# Patient Record
Sex: FEMALE | Race: WHITE | NOT HISPANIC OR LATINO | URBAN - METROPOLITAN AREA
[De-identification: names, ages, dates, MRNs, and addresses within clinical notes are randomized per-mention and may not be internally consistent; named-entity substitution may affect disease eponyms.]

---

## 2019-12-10 ENCOUNTER — EMERGENCY (EMERGENCY)
Facility: HOSPITAL | Age: 44
LOS: 1 days | Discharge: HOME | End: 2019-12-10
Admitting: EMERGENCY MEDICINE
Payer: OTHER MISCELLANEOUS

## 2019-12-10 VITALS
WEIGHT: 140.21 LBS | RESPIRATION RATE: 16 BRPM | SYSTOLIC BLOOD PRESSURE: 153 MMHG | HEART RATE: 70 BPM | TEMPERATURE: 98 F | DIASTOLIC BLOOD PRESSURE: 84 MMHG | OXYGEN SATURATION: 99 %

## 2019-12-10 PROCEDURE — 70486 CT MAXILLOFACIAL W/O DYE: CPT | Mod: 26

## 2019-12-10 PROCEDURE — 72100 X-RAY EXAM L-S SPINE 2/3 VWS: CPT | Mod: 26

## 2019-12-10 PROCEDURE — 99284 EMERGENCY DEPT VISIT MOD MDM: CPT

## 2019-12-10 PROCEDURE — 72100 X-RAY EXAM L-S SPINE 2/3 VWS: CPT

## 2019-12-10 PROCEDURE — 70450 CT HEAD/BRAIN W/O DYE: CPT | Mod: 26

## 2019-12-10 PROCEDURE — 70450 CT HEAD/BRAIN W/O DYE: CPT

## 2019-12-10 PROCEDURE — 70486 CT MAXILLOFACIAL W/O DYE: CPT

## 2019-12-10 RX ORDER — ACETAMINOPHEN 500 MG
650 TABLET ORAL ONCE
Refills: 0 | Status: COMPLETED | OUTPATIENT
Start: 2019-12-10 | End: 2019-12-10

## 2019-12-10 RX ADMIN — Medication 650 MILLIGRAM(S): at 10:24

## 2019-12-10 NOTE — ED PROVIDER NOTE - MUSCULOSKELETAL, MLM
+ midline tenderness to LS spine on palpation. Full ROM of R hip. no pain to knee or ankle. no tenderness to other extremities.  no rib tenderness no step-offs, deformities, ecchymosis or swelling noted. Spine appears normal, range of motion is not limited, no muscle or joint tenderness

## 2019-12-10 NOTE — ED PROVIDER NOTE - CONSTITUTIONAL, MLM
normal... pt ambulating well. Well appearing, awake, alert, oriented to person, place, time/situation and in no apparent distress.

## 2019-12-10 NOTE — ED ADULT NURSE NOTE - CHPI ED NUR SYMPTOMS NEG
no abrasion/no loss of consciousness/no numbness/no tingling/no vomiting/no weakness/no fever/no bleeding/no deformity/no confusion

## 2019-12-10 NOTE — ED PROVIDER NOTE - CLINICAL SUMMARY MEDICAL DECISION MAKING FREE TEXT BOX
Patient with multiple injuries s/p fall.  Well appearing, NAD and VSS. Ct scan all neg. + MSK pain from fall. Ambulating well. Recommend rest, ice and tylenol/ motrin for pain PRN.

## 2019-12-10 NOTE — ED ADULT TRIAGE NOTE - CHIEF COMPLAINT QUOTE
Patient s/p slipped and fall at work this am , c/o rt hip pain , lower back pain , headache and rt facial pain . Denies any loc . Not on thinner .

## 2019-12-10 NOTE — ED PROVIDER NOTE - ENMT, MLM
Airway patent, no palpable hematoma on R perietalis area of the head. No facial ecchymosis. + tenderness to R zygomatic arch and TMJ. Able to fully open mouth. No raccoon eyes no caceres signs. Full ROM of neck w/o midline tenderness. Airway patent, TMI, no palpable hematoma on R parietal area of the head. No facial ecchymosis. + tenderness to R zygomatic arch and TMJ. Able to fully open mouth. No raccoon eyes no caceres signs. Full ROM of neck w/o midline tenderness.

## 2019-12-10 NOTE — ED ADULT NURSE NOTE - OBJECTIVE STATEMENT
pt arrived sitting in chair, A&O x 3. Patient s/p slipped and fall at work this am , c/o rt hip pain , lower back pain , headache and rt facial pain . Denies any loc . Not on thinner. NAD noted, will continue to monitor.

## 2019-12-10 NOTE — ED PROVIDER NOTE - CARE PLAN
Principal Discharge DX:	Fall  Secondary Diagnosis:	Back pain  Secondary Diagnosis:	Head injury  Secondary Diagnosis:	Muscle strain

## 2019-12-10 NOTE — ED PROVIDER NOTE - NSFOLLOWUPINSTRUCTIONS_ED_ALL_ED_FT
ACUTE LOW BACK PAIN - General Information     Acute Low Back Pain    WHAT YOU NEED TO KNOW:    What is acute low back pain? Acute low back pain is sudden discomfort in your lower back area that lasts for up to 6 weeks. The discomfort makes it difficult to tolerate activity.     What causes or increases my risk for acute low back pain? Conditions that affect the spine, joints, or muscles can cause back pain. These may include arthritis, spinal stenosis (narrowing of the spinal column), muscle tension, or breakdown of the spinal discs. The following increase your risk of back pain:     Repeated bending, lifting, or twisting, or lifting heavy items      Injury from a fall or accident      Lack of regular physical activity       Obesity or pregnancy       Smoking      Aging      Driving, sitting, or standing for long periods      Bad posture while sitting or standing    How is acute low back pain diagnosed? Your healthcare provider will ask about your medical history and examine you. He or she may ask when you last had low back pain and how it started. Show him or her where you feel the pain and what makes it feel better or worse. Tell your provider about the type of pain you have, how bad it is, and how long it lasts. Tell him or her if your pain worsens at night or when you lie down on your back.    How is acute low back pain treated? The goal of treatment is to relieve your pain and help you tolerate activity. Most people with acute low back pain get better within 4 to 6 weeks. You may need any of the following:    NSAIDs help decrease swelling and pain. This medicine is available with or without a doctor's order. NSAIDs can cause stomach bleeding or kidney problems in certain people. If you take blood thinner medicine, always ask your healthcare provider if NSAIDs are safe for you. Always read the medicine label and follow directions.      Acetaminophen decreases pain and fever. It is available without a doctor's order. Ask how much to take and how often to take it. Follow directions. Read the labels of all other medicines you are using to see if they also contain acetaminophen, or ask your doctor or pharmacist. Acetaminophen can cause liver damage if not taken correctly. Do not use more than 4 grams (4,000 milligrams) total of acetaminophen in one day.       Muscle relaxers decrease pain by relaxing the muscles in your lower spine.      Prescription pain medicine may be given. Ask your healthcare provider how to take this medicine safely. Some prescription pain medicines contain acetaminophen. Do not take other medicines that contain acetaminophen without talking to your healthcare provider. Too much acetaminophen may cause liver damage. Prescription pain medicine may cause constipation. Ask your healthcare provider how to prevent or treat constipation.     What can I do to prevent low back pain?     Use proper body mechanics.   Bend at the hips and knees when you  objects. Do not bend from the waist. Use your leg muscles as you lift the load. Do not use your back. Keep the object close to your chest as you lift it. Try not to twist or lift anything above your waist.      Change your position often when you stand for long periods of time. Rest one foot on a small box or footrest, and then switch to the other foot often.      Try not to sit for long periods of time. When you do, sit in a straight-backed chair with your feet flat on the floor. Never reach, pull, or push while you are sitting.      Do exercises that strengthen your back muscles. Warm up before you exercise. Ask your healthcare provider the best exercises for you.      Maintain a healthy weight. Ask your healthcare provider how much you should weigh. Ask him or her to help you create a weight loss plan if you are overweight.    How can I take care of myself if I have acute low back pain?     Stay active as much as you can without causing more pain. Bed rest could make your back pain worse. Start with some light exercises, such as walking. Avoid heavy lifting until your pain is gone. Ask for more information about the activities or exercises that are right for you.       Apply ice on your back for 15 to 20 minutes every hour or as directed. Use an ice pack, or put crushed ice in a plastic bag. Cover it with a towel before you apply it to your skin. Ice helps prevent tissue damage and decreases swelling and pain.       Apply heat on your back for 20 to 30 minutes every 2 hours for as many days as directed. Heat helps decrease pain and muscle spasms. Alternate heat and ice.    When should I seek immediate care?     You have severe pain.      You have sudden stiffness and heaviness down both legs.      You have numbness or weakness in one leg, or pain in both legs.      You have numbness in your genital area or across your lower back.      You cannot control your urine or bowel movements.    When should I contact my healthcare provider?     You have a fever.      You have pain at night or when you rest.      Your pain does not get better with treatment.      You have pain that worsens when you cough or sneeze.      You suddenly feel something pop or snap in your back.      You have questions or concerns about your condition or care.    CARE AGREEMENT:    You have the right to help plan your care. Learn about your health condition and how it may be treated. Discuss treatment options with your healthcare providers to decide what care you want to receive. You always have the right to refuse treatment.        © Copyright [employer] 2019       back to top                      © Copyright [employer] 2019

## 2019-12-10 NOTE — ED ADULT NURSE NOTE - PAIN: PRESENCE, MLM
no nausea/no constipation/no diarrhea/no change in bowel habits/no vomiting complains of pain/discomfort

## 2019-12-10 NOTE — ED PROVIDER NOTE - OBJECTIVE STATEMENT
45 y/o F with no PMHx lower back pain s/p mechanical fall. Pt was at work today, was walking to the cafeteria when the floor was wet, and slipped and fell hitting her R side face and head on the ground at 8:40am. endorses nausea and unsteadiness. denies LOC, dizziness, rib tenderness, abd pain, SOB, and chest pain. 45 y/o F with no PMHx  c/o lower back pain s/p mechanical fall. Pt was at work today, was walking to the cafeteria when the floor was wet, she  slipped and fell hitting her R side face and head on the ground at 8:40am. Endorses nausea and unsteadiness. Denies LOC, dizziness, rib tenderness, abd pain, SOB, and chest pain. No paresthesia, or paresis.

## 2019-12-10 NOTE — ED PROVIDER NOTE - PATIENT PORTAL LINK FT
You can access the FollowMyHealth Patient Portal offered by [facility] by registering at the following website: [email or web address]. By joining [employer]’s FollowMyHealth portal, you will also be able to view your health information using other applications (apps) compatible with our system.

## 2019-12-16 DIAGNOSIS — S39.012A STRAIN OF MUSCLE, FASCIA AND TENDON OF LOWER BACK, INITIAL ENCOUNTER: ICD-10-CM

## 2019-12-16 DIAGNOSIS — M54.5 LOW BACK PAIN: ICD-10-CM
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
## 2019-12-16 DIAGNOSIS — S09.90XA UNSPECIFIED INJURY OF HEAD, INITIAL ENCOUNTER: ICD-10-CM
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
## 2023-05-05 ENCOUNTER — EMERGENCY (EMERGENCY)
Facility: HOSPITAL | Age: 48
LOS: 1 days | Discharge: HOME | End: 2023-05-05
Attending: EMERGENCY MEDICINE | Admitting: EMERGENCY MEDICINE
Payer: COMMERCIAL

## 2023-05-05 VITALS
RESPIRATION RATE: 18 BRPM | HEART RATE: 98 BPM | WEIGHT: 149.03 LBS | TEMPERATURE: 97 F | OXYGEN SATURATION: 100 % | DIASTOLIC BLOOD PRESSURE: 82 MMHG | SYSTOLIC BLOOD PRESSURE: 132 MMHG

## 2023-05-05 VITALS
OXYGEN SATURATION: 99 % | DIASTOLIC BLOOD PRESSURE: 77 MMHG | SYSTOLIC BLOOD PRESSURE: 119 MMHG | HEART RATE: 77 BPM | RESPIRATION RATE: 16 BRPM

## 2023-05-05 DIAGNOSIS — R11.2 NAUSEA WITH VOMITING, UNSPECIFIED: ICD-10-CM

## 2023-05-05 DIAGNOSIS — R55 SYNCOPE AND COLLAPSE: ICD-10-CM

## 2023-05-05 DIAGNOSIS — K29.70 GASTRITIS, UNSPECIFIED, WITHOUT BLEEDING: ICD-10-CM

## 2023-05-05 DIAGNOSIS — R10.13 EPIGASTRIC PAIN: ICD-10-CM

## 2023-05-05 DIAGNOSIS — R68.83 CHILLS (WITHOUT FEVER): ICD-10-CM

## 2023-05-05 PROBLEM — Z78.9 OTHER SPECIFIED HEALTH STATUS: Chronic | Status: ACTIVE | Noted: 2019-12-11

## 2023-05-05 LAB
ALBUMIN SERPL ELPH-MCNC: 4.5 G/DL — SIGNIFICANT CHANGE UP (ref 3.3–5)
ALP SERPL-CCNC: 85 U/L — SIGNIFICANT CHANGE UP (ref 40–120)
ALT FLD-CCNC: 45 U/L — SIGNIFICANT CHANGE UP (ref 10–45)
ANION GAP SERPL CALC-SCNC: 12 MMOL/L — SIGNIFICANT CHANGE UP (ref 5–17)
APPEARANCE UR: CLEAR — SIGNIFICANT CHANGE UP
APTT BLD: 28.7 SEC — SIGNIFICANT CHANGE UP (ref 27.5–35.5)
AST SERPL-CCNC: 33 U/L — SIGNIFICANT CHANGE UP (ref 10–40)
BASOPHILS # BLD AUTO: 0.04 K/UL — SIGNIFICANT CHANGE UP (ref 0–0.2)
BASOPHILS NFR BLD AUTO: 0.5 % — SIGNIFICANT CHANGE UP (ref 0–2)
BILIRUB SERPL-MCNC: 0.4 MG/DL — SIGNIFICANT CHANGE UP (ref 0.2–1.2)
BILIRUB UR-MCNC: NEGATIVE — SIGNIFICANT CHANGE UP
BUN SERPL-MCNC: 13 MG/DL — SIGNIFICANT CHANGE UP (ref 7–23)
CALCIUM SERPL-MCNC: 10 MG/DL — SIGNIFICANT CHANGE UP (ref 8.4–10.5)
CHLORIDE SERPL-SCNC: 100 MMOL/L — SIGNIFICANT CHANGE UP (ref 96–108)
CHOLEST SERPL-MCNC: 243 MG/DL — HIGH
CO2 SERPL-SCNC: 23 MMOL/L — SIGNIFICANT CHANGE UP (ref 22–31)
COLOR SPEC: YELLOW — SIGNIFICANT CHANGE UP
CREAT SERPL-MCNC: 0.67 MG/DL — SIGNIFICANT CHANGE UP (ref 0.5–1.3)
DIFF PNL FLD: NEGATIVE — SIGNIFICANT CHANGE UP
EGFR: 109 ML/MIN/1.73M2 — SIGNIFICANT CHANGE UP
EOSINOPHIL # BLD AUTO: 0.04 K/UL — SIGNIFICANT CHANGE UP (ref 0–0.5)
EOSINOPHIL NFR BLD AUTO: 0.5 % — SIGNIFICANT CHANGE UP (ref 0–6)
GLUCOSE SERPL-MCNC: 191 MG/DL — HIGH (ref 70–99)
GLUCOSE UR QL: NEGATIVE — SIGNIFICANT CHANGE UP
HCT VFR BLD CALC: 40 % — SIGNIFICANT CHANGE UP (ref 34.5–45)
HDLC SERPL-MCNC: 77 MG/DL — SIGNIFICANT CHANGE UP
HGB BLD-MCNC: 13.5 G/DL — SIGNIFICANT CHANGE UP (ref 11.5–15.5)
IMM GRANULOCYTES NFR BLD AUTO: 0.1 % — SIGNIFICANT CHANGE UP (ref 0–0.9)
INR BLD: 0.98 — SIGNIFICANT CHANGE UP (ref 0.88–1.16)
KETONES UR-MCNC: NEGATIVE — SIGNIFICANT CHANGE UP
LEUKOCYTE ESTERASE UR-ACNC: NEGATIVE — SIGNIFICANT CHANGE UP
LIPID PNL WITH DIRECT LDL SERPL: 154 MG/DL — HIGH
LYMPHOCYTES # BLD AUTO: 2.51 K/UL — SIGNIFICANT CHANGE UP (ref 1–3.3)
LYMPHOCYTES # BLD AUTO: 30.4 % — SIGNIFICANT CHANGE UP (ref 13–44)
MCHC RBC-ENTMCNC: 30.2 PG — SIGNIFICANT CHANGE UP (ref 27–34)
MCHC RBC-ENTMCNC: 33.8 GM/DL — SIGNIFICANT CHANGE UP (ref 32–36)
MCV RBC AUTO: 89.5 FL — SIGNIFICANT CHANGE UP (ref 80–100)
MONOCYTES # BLD AUTO: 0.49 K/UL — SIGNIFICANT CHANGE UP (ref 0–0.9)
MONOCYTES NFR BLD AUTO: 5.9 % — SIGNIFICANT CHANGE UP (ref 2–14)
NEUTROPHILS # BLD AUTO: 5.17 K/UL — SIGNIFICANT CHANGE UP (ref 1.8–7.4)
NEUTROPHILS NFR BLD AUTO: 62.6 % — SIGNIFICANT CHANGE UP (ref 43–77)
NITRITE UR-MCNC: NEGATIVE — SIGNIFICANT CHANGE UP
NON HDL CHOLESTEROL: 166 MG/DL — HIGH
NRBC # BLD: 0 /100 WBCS — SIGNIFICANT CHANGE UP (ref 0–0)
PH UR: 7.5 — SIGNIFICANT CHANGE UP (ref 5–8)
PLATELET # BLD AUTO: 293 K/UL — SIGNIFICANT CHANGE UP (ref 150–400)
POTASSIUM SERPL-MCNC: 3.6 MMOL/L — SIGNIFICANT CHANGE UP (ref 3.5–5.3)
POTASSIUM SERPL-SCNC: 3.6 MMOL/L — SIGNIFICANT CHANGE UP (ref 3.5–5.3)
PROT SERPL-MCNC: 7.9 G/DL — SIGNIFICANT CHANGE UP (ref 6–8.3)
PROT UR-MCNC: NEGATIVE MG/DL — SIGNIFICANT CHANGE UP
PROTHROM AB SERPL-ACNC: 11.7 SEC — SIGNIFICANT CHANGE UP (ref 10.5–13.4)
RBC # BLD: 4.47 M/UL — SIGNIFICANT CHANGE UP (ref 3.8–5.2)
RBC # FLD: 13.1 % — SIGNIFICANT CHANGE UP (ref 10.3–14.5)
SODIUM SERPL-SCNC: 135 MMOL/L — SIGNIFICANT CHANGE UP (ref 135–145)
SP GR SPEC: 1.01 — SIGNIFICANT CHANGE UP (ref 1–1.03)
TRIGL SERPL-MCNC: 61 MG/DL — SIGNIFICANT CHANGE UP
TROPONIN T SERPL-MCNC: 0.01 NG/ML — SIGNIFICANT CHANGE UP (ref 0–0.01)
UROBILINOGEN FLD QL: 0.2 E.U./DL — SIGNIFICANT CHANGE UP
WBC # BLD: 8.26 K/UL — SIGNIFICANT CHANGE UP (ref 3.8–10.5)
WBC # FLD AUTO: 8.26 K/UL — SIGNIFICANT CHANGE UP (ref 3.8–10.5)

## 2023-05-05 PROCEDURE — 70498 CT ANGIOGRAPHY NECK: CPT | Mod: MA

## 2023-05-05 PROCEDURE — 99283 EMERGENCY DEPT VISIT LOW MDM: CPT | Mod: 25

## 2023-05-05 PROCEDURE — 76705 ECHO EXAM OF ABDOMEN: CPT | Mod: 26

## 2023-05-05 PROCEDURE — 70450 CT HEAD/BRAIN W/O DYE: CPT | Mod: MA

## 2023-05-05 PROCEDURE — 0042T: CPT | Mod: MA

## 2023-05-05 PROCEDURE — 76705 ECHO EXAM OF ABDOMEN: CPT

## 2023-05-05 PROCEDURE — 82962 GLUCOSE BLOOD TEST: CPT

## 2023-05-05 PROCEDURE — 80053 COMPREHEN METABOLIC PANEL: CPT

## 2023-05-05 PROCEDURE — 85610 PROTHROMBIN TIME: CPT

## 2023-05-05 PROCEDURE — 70496 CT ANGIOGRAPHY HEAD: CPT | Mod: MA

## 2023-05-05 PROCEDURE — 71045 X-RAY EXAM CHEST 1 VIEW: CPT

## 2023-05-05 PROCEDURE — 99285 EMERGENCY DEPT VISIT HI MDM: CPT

## 2023-05-05 PROCEDURE — 71045 X-RAY EXAM CHEST 1 VIEW: CPT | Mod: 26

## 2023-05-05 PROCEDURE — 81003 URINALYSIS AUTO W/O SCOPE: CPT

## 2023-05-05 PROCEDURE — 36415 COLL VENOUS BLD VENIPUNCTURE: CPT

## 2023-05-05 PROCEDURE — 74177 CT ABD & PELVIS W/CONTRAST: CPT | Mod: 26,MA

## 2023-05-05 PROCEDURE — 80061 LIPID PANEL: CPT

## 2023-05-05 PROCEDURE — 85730 THROMBOPLASTIN TIME PARTIAL: CPT

## 2023-05-05 PROCEDURE — 93005 ELECTROCARDIOGRAM TRACING: CPT

## 2023-05-05 PROCEDURE — 74177 CT ABD & PELVIS W/CONTRAST: CPT | Mod: MA

## 2023-05-05 PROCEDURE — 70498 CT ANGIOGRAPHY NECK: CPT | Mod: 26,MA

## 2023-05-05 PROCEDURE — 84484 ASSAY OF TROPONIN QUANT: CPT

## 2023-05-05 PROCEDURE — 99285 EMERGENCY DEPT VISIT HI MDM: CPT | Mod: 25

## 2023-05-05 PROCEDURE — 85025 COMPLETE CBC W/AUTO DIFF WBC: CPT

## 2023-05-05 PROCEDURE — 70496 CT ANGIOGRAPHY HEAD: CPT | Mod: 26,MA

## 2023-05-05 RX ORDER — SODIUM CHLORIDE 9 MG/ML
1000 INJECTION INTRAMUSCULAR; INTRAVENOUS; SUBCUTANEOUS ONCE
Refills: 0 | Status: COMPLETED | OUTPATIENT
Start: 2023-05-05 | End: 2023-05-05

## 2023-05-05 RX ORDER — MECLIZINE HCL 12.5 MG
25 TABLET ORAL ONCE
Refills: 0 | Status: COMPLETED | OUTPATIENT
Start: 2023-05-05 | End: 2023-05-05

## 2023-05-05 RX ORDER — IOHEXOL 300 MG/ML
30 INJECTION, SOLUTION INTRAVENOUS ONCE
Refills: 0 | Status: COMPLETED | OUTPATIENT
Start: 2023-05-05 | End: 2023-05-05

## 2023-05-05 RX ADMIN — Medication 25 MILLIGRAM(S): at 15:49

## 2023-05-05 RX ADMIN — SODIUM CHLORIDE 1000 MILLILITER(S): 9 INJECTION INTRAMUSCULAR; INTRAVENOUS; SUBCUTANEOUS at 20:17

## 2023-05-05 RX ADMIN — SODIUM CHLORIDE 1000 MILLILITER(S): 9 INJECTION INTRAMUSCULAR; INTRAVENOUS; SUBCUTANEOUS at 15:49

## 2023-05-05 RX ADMIN — IOHEXOL 30 MILLILITER(S): 300 INJECTION, SOLUTION INTRAVENOUS at 16:18

## 2023-05-05 NOTE — ED PROVIDER NOTE - PHYSICAL EXAMINATION
VITAL SIGNS: I have reviewed nursing notes and confirm.  CONSTITUTIONAL: Well-developed; well-nourished; in no acute distress.  SKIN: Agree with RN documentation regarding decubitus evaluation. Remainder of skin exam is warm and dry, no acute rash.  HEAD: Normocephalic; atraumatic.  EYES: PERRL, EOM intact; conjunctiva and sclera clear.  ENT: No nasal discharge; airway clear.  NECK: Supple; non tender.  CARD: S1, S2 normal; no murmurs, gallops, or rubs. Regular rate and rhythm.  RESP: No wheezes, rales or rhonchi.  ABD: Normal bowel sounds; soft; non-distended; epigastric tenderness with no guarding; no hepatosplenomegaly.  EXT: Normal ROM. No clubbing, cyanosis or edema.  LYMPH: No acute cervical adenopathy.  NEURO: Alert, oriented. Grossly unremarkable.  PSYCH: Cooperative, appropriate.

## 2023-05-05 NOTE — ED PROVIDER NOTE - PATIENT PORTAL LINK FT
You can access the FollowMyHealth Patient Portal offered by [facility] by registering at the following website: [email or web address]. By joining [email or web address]’s FollowMyHealth portal, you will also be able to view your health information using other applications (apps) compatible with our system.

## 2023-05-05 NOTE — ED ADULT TRIAGE NOTE - CHIEF COMPLAINT QUOTE
BIBEMS co nonradiating epigastric pain x2 hours s/p eating lunch. Denies CP, SOB, headache, dizziness, neck pain, back pain.

## 2023-05-05 NOTE — ED PROVIDER NOTE - NSFOLLOWUPINSTRUCTIONS_ED_ALL_ED_FT
Abdominal Pain    Many things can cause abdominal pain. Many times, abdominal pain is not caused by a disease and will improve without treatment. Your health care provider will do a physical exam to determine if there is a dangerous cause of your pain; blood tests and imaging may help determine the cause of your pain. However, in many cases, no cause may be found and you may need further testing as an outpatient. Monitor your abdominal pain for any changes.     SEEK IMMEDIATE MEDICAL CARE IF YOU HAVE ANY OF THE FOLLOWING SYMPTOMS: worsening abdominal pain, uncontrollable vomiting, profuse diarrhea, inability to have bowel movements or pass gas, black or bloody stools, fever accompanying chest pain or back pain, or fainting. These symptoms may represent a serious problem that is an emergency. Do not wait to see if the symptoms will go away. Get medical help right away. Call 911 and do not drive yourself to the hospital.       SEEK IMMEDIATE MEDICAL CARE IF YOU HAVE ANY OF THE FOLLOWING SYMPTOMS: severe headache, pain in your chest/abdomen/back, bleeding from your mouth or rectum, palpitations, shortness of breath, pain with breathing, seizure, confusion, or trouble walking.

## 2023-05-05 NOTE — ED PROVIDER NOTE - WR INTERPRETATION 1
CXR negative - No tracheal deviation, No pneumothorax, No subdiaphragmaticCXR negative - No CHF, No cardiomegaly, No pleural effusionsCXR negative - No infiltrates, No consolidation, No atelectasis seenCXR negative - No pneumothorax, No opacities, No free

## 2023-05-05 NOTE — ED ADULT NURSE NOTE - OBJECTIVE STATEMENT
Pt arrived to ED c/o epigastric pain. Pt reports feeling dizzy/lightheaded at work around 1230pm as well as epigastric pain. Pt started to feel "confused" and blurry vission while in the ED. Pt AOx4, no neuro deficits noted, MD Contrersa at bedside. Stroke code called, IV placed, labs drawn and sent.

## 2023-05-05 NOTE — ED PROVIDER NOTE - CLINICAL SUMMARY MEDICAL DECISION MAKING FREE TEXT BOX
48 y/o F with episode of vertigo vs near syncope, followed by epigastric discomfort. Pt neuro exam non focal, however, when answering questions is responding slowly and non fluently and reporting ataxia, so stroke code was called and stroke imaging was reassuring. Pt then developed chills and still had persistent epigastric abdominal pain, consider possible lightheadedness secondary to vasovagal from abdominal pain, consider infection, metabolic abnormalities so far non identified. With exception of hyperglycemic possibly secondary to this. Will CT abdomen and pelvis to rule out further abdominal pathology and infection. Imaging is ok. Will advise f/u and outpatient for hyperglycemia and hypercholesteremia.

## 2023-05-05 NOTE — ED PROVIDER NOTE - OBJECTIVE STATEMENT
46 y/o F generally healthy reports that today around noon she started feeling lightheaded, dizzy and unsteady on her feet as if she might fall. Pt is having difficulty clarifying if her symptoms are lightheadedness vs ataxia. Pt reports following her symptoms she started to develop new epigastric pain which is constant since and feeling of chills. Pt also reports associated nausea but no vomiting. Pt denies diarrhea, constipation, or fever. Pt repots her vision at the time was blurry and she was feeling confused since event.

## 2023-05-05 NOTE — CONSULT NOTE ADULT - ASSESSMENT
47y Female with no past medical history, not on medications initially presented c/o of gastric pain. She mentioned to ED provider that she also felt lightheaded, dizzy and unsteady on her feet since 12:30PM today. Stroke code activated. /82. NIHSS 0. Gait narrow and steady. CT imaging unremarkable. No risk factors for stroke. Stroke not the cause of patient's symptoms.     - no further stroke w/u needed    Case discussed with Dr. Jean

## 2023-05-05 NOTE — ED ADULT NURSE REASSESSMENT NOTE - NS ED NURSE REASSESS COMMENT FT1
pt noted to be ambulating in south. pt brought back to north side of the ED, and medicated. pending imaging results.

## 2023-05-05 NOTE — CONSULT NOTE ADULT - SUBJECTIVE AND OBJECTIVE BOX
**STROKE CODE CONSULT NOTE**    Last known well time/Time of onset of symptoms: 5/5 12:30PM    HPI: 47y Female with no past medical history, not on medications initially presented c/o of gastric pain. She mentioned to ED provider that she also felt lightheaded, dizzy and unsteady on her feet since 12:30PM today. Stroke code activated. /82. NIHSS 0. Gait narrow and steady. CT imaging unremarkable. During the time of her symptom onset, she felt room spinning and generally unwell, although unable to be specific about what was bother her. She suddenly felt extreme hungry that caused her to eat fast with subsequent gastric pain. She is steady when walking, but does not feel confident or sure of herself when taking steps. Denies changes in vision, slurred speech, extremity weakness/numbness.     T(C): 36.3 (05-05-23 @ 14:18), Max: 36.3 (05-05-23 @ 14:18)  HR: 98 (05-05-23 @ 14:18) (98 - 98)  BP: 132/82 (05-05-23 @ 14:18) (132/82 - 132/82)  RR: 18 (05-05-23 @ 14:18) (18 - 18)  SpO2: 100% (05-05-23 @ 14:18) (100% - 100%)    PAST MEDICAL & SURGICAL HISTORY:  No pertinent past medical history      No significant past surgical history          FAMILY HISTORY:      SOCIAL HISTORY:       ROS:   Constitutional: No fever, weight loss or fatigue  Eyes: No eye pain, visual disturbances, or discharge  ENMT:  No difficulty hearing, tinnitus; No sinus or throat pain  Neck: No pain or stiffness  Respiratory: No cough, wheezing, chills or hemoptysis  Cardiovascular: No chest pain, palpitations, shortness of breath, or leg swelling  Gastrointestinal: abdominal pain  Genitourinary: No dysuria, frequency, hematuria or incontinence  Neurological: As per HPI      MEDICATIONS  (STANDING):  iohexol 300 mG (iodine)/mL Oral Solution 30 milliLiter(s) Oral once    MEDICATIONS  (PRN):    Allergies    No Known Allergies    Intolerances      Vital Signs Last 24 Hrs  T(C): 36.3 (05 May 2023 14:18), Max: 36.3 (05 May 2023 14:18)  T(F): 97.4 (05 May 2023 14:18), Max: 97.4 (05 May 2023 14:18)  HR: 98 (05 May 2023 14:18) (98 - 98)  BP: 132/82 (05 May 2023 14:18) (132/82 - 132/82)  BP(mean): --  RR: 18 (05 May 2023 14:18) (18 - 18)  SpO2: 100% (05 May 2023 14:18) (100% - 100%)    Parameters below as of 05 May 2023 14:18  Patient On (Oxygen Delivery Method): room air        Physical exam:  Constitutional: No acute distress, conversant  Eyes: Anicteric sclerae, moist conjunctivae, see below for CNs  Neck: trachea midline, FROM, supple, no thyromegaly or lymphadenopathy  Cardiovascular: Regular rate and rhythm on tele  Pulmonary: No increased work of breathing. No use of accessory muscles  GI: Abdomen soft, non-distended, non-tender on palpitation   Extremities: no edema    Neurologic:  -Mental status: Awake, alert, oriented to person, place, and time. Speech is fluent with intact naming, repetition, and comprehension, no dysarthria. Recent and remote memory intact. Follows commands. Attention/concentration intact. Fund of knowledge appropriate.  -Cranial nerves:   II: Visual fields are full to confrontation.  III, IV, VI: Extraocular movements are intact without nystagmus. Pupils equally round and reactive to light  V:  Facial sensation V1-V3 equal and intact   VII: Face is symmetric with normal eye closure and smile  VIII: Hearing is bilaterally intact   XII: Tongue protrudes midline  Motor: Normal bulk and tone. No pronator drift. Strength bilateral upper extremity 5/5, bilateral lower extremities 5/5.  Sensation: Intact to light touch bilaterally. No neglect or extinction on double simultaneous testing.  Coordination: No dysmetria on finger-to-nose bilaterally  Gait: Narrow gait and steady    NIHSS: 0    Fingerstick Blood Glucose: CAPILLARY BLOOD GLUCOSE      POCT Blood Glucose.: 170 mg/dL (05 May 2023 14:52)    LABS:                        13.5   8.26  )-----------( 293      ( 05 May 2023 15:26 )             40.0     05-05    135  |  100  |  13  ----------------------------<  191<H>  3.6   |  23  |  0.67    Ca    10.0      05 May 2023 15:26    TPro  7.9  /  Alb  4.5  /  TBili  0.4  /  DBili  x   /  AST  33  /  ALT  45  /  AlkPhos  85  05-05    PT/INR - ( 05 May 2023 15:26 )   PT: 11.7 sec;   INR: 0.98          PTT - ( 05 May 2023 15:26 )  PTT:28.7 sec  CARDIAC MARKERS ( 05 May 2023 15:26 )  x     / 0.01 ng/mL / x     / x     / x              RADIOLOGY & ADDITIONAL STUDIES:  < from: CT Brain Stroke Protocol (05.05.23 @ 15:26) >  IMPRESSION:  No acute intracranial hemorrhage or demarcated infarct.    < end of copied text >  < from: CT Brain Perfusion Maps Stroke (05.05.23 @ 15:28) >    IMPRESSION:    Normal CT perfusion of the brain.    < end of copied text >  < from: CT Angio Brain Stroke Protocol  w/ IV Cont (05.05.23 @ 15:27) >    IMPRESSION:    Unremarkable CTA of the head and neck. No arterial steno-occlusive   disease or evidence of dissection.    < end of copied text >      -----------------------------------------------------------------------------------------------------------------  IV-tPA (Y/N):    no                             Bolus time:    Alteplase Dose Verification w/ RN:  Reason IV-tPA not given: no focal disabling deficits